# Patient Record
Sex: FEMALE | Race: OTHER | Employment: OTHER | URBAN - METROPOLITAN AREA
[De-identification: names, ages, dates, MRNs, and addresses within clinical notes are randomized per-mention and may not be internally consistent; named-entity substitution may affect disease eponyms.]

---

## 2017-04-03 NOTE — PATIENT DISCUSSION
(H02.135) Senile ectropion of left lower eyelid - Assesment : Examination revealed Senile Ectropion (slight). Patient had surgery about a month ago with . Continue massaging area and using ointment per 's post op care. Advised patient that lid is pulling down/away secondary to surgery and eye is drying out recommend lubrication treatment this time. - Plan : Monitor for change in vision. Continue follow up care with Dr. Shane Ricks OTC artificial tears 3-4 times a day in both eyes. Rtc 3-4 months follow up and Exam OCTM.

## 2017-08-21 NOTE — PATIENT DISCUSSION
(W40.1307) Nonexudative age-related macular degeneration left eye inter - Assesment : Examination revealed AMD Dry.-INTERMEDIATE with slight elevation. Advised patient changes on examination and testing - left eye. Recommend retina consult to further evaluate and rule out possible Wet ARMD - left eye - Plan : Retina Consult  Patient advised to check [employer] regularly (once weekly or more) and use nutraceuticals such as AREDS 2 eye vitamins. Wear sunglasses when outdoors and eat green, leafy vegetables to maintain ocular health.

## 2017-08-21 NOTE — PATIENT DISCUSSION
(Q23.7474) Nonexudative age-related macular degeneration right eye riya - Assesment : Examination revealed AMD Dry.-MILD - Plan : Retina Consult. Advised patient to call our office with decreased vision or increased distortion. Patient advised to check [employer] regularly (once weekly or more) and use nutraceuticals such as AREDS 2 eye vitamins. Wear sunglasses when outdoors and eat green, leafy vegetables to maintain ocular health.

## 2018-06-01 NOTE — PATIENT DISCUSSION
(C56.8240) Nonexudative age-related macular degeneration left eye inter - Assesment : Examination revealed AMD Dry.-INTERMEDIATE - Plan : See plan # 1

## 2018-06-01 NOTE — PATIENT DISCUSSION
(H02.135) Senile ectropion of left lower eyelid - Assesment : Examination revealed Senile Ectropion (slight). - Plan : Monitor for change in vision. Start GenTeal Gel apply to lower lid QHS. Advised to call office if symptoms worsen.

## 2018-06-01 NOTE — PATIENT DISCUSSION
(H11.041) Peripheral pterygium, stationary, right eye - Assesment : Examination revealed Pterygium-Progressive. Nasal - Plan : Monitor for changes. Advised patient to call our office with decreased vision or increased symptoms.

## 2018-06-01 NOTE — PATIENT DISCUSSION
(H36.5371) Nonexudative age-related macular degeneration right eye riya - Assesment : Examination revealed AMD Dry.-MILD - Plan : Advised patient to call our office with decreased vision or increased distortion. Patient advised to check [employer] regularly (once weekly or more) and use nutraceuticals such as AREDS 2 eye vitamins. Wear sunglasses when outdoors and eat green, leafy vegetables to maintain ocular health. G.lasse updated and given.   RTC 6 months follow up/OCT mac

## 2018-12-07 NOTE — PATIENT DISCUSSION
(H37.4522) Nonexudative age-related macular degeneration right eye riya - Assesment : Examination revealed AMD Dry.-MILD possible progression - Plan : Advised patient to call our office with decreased vision or increased distortion. Patient advised to check [employer] regularly (once weekly or more) and use nutraceuticals such as AREDS 2 eye vitamins. Wear sunglasses when outdoors and eat green, leafy vegetables to maintain ocular health.    RTC 3 months dilation/OCT mac,or  sooner with changes to vision

## 2019-02-15 ENCOUNTER — NEW PATIENT COMPREHENSIVE (OUTPATIENT)
Dept: URBAN - METROPOLITAN AREA CLINIC 43 | Facility: CLINIC | Age: 54
End: 2019-02-15

## 2019-02-15 DIAGNOSIS — H52.4: ICD-10-CM

## 2019-02-15 PROCEDURE — 92004 COMPRE OPH EXAM NEW PT 1/>: CPT

## 2019-02-15 PROCEDURE — 92015 DETERMINE REFRACTIVE STATE: CPT

## 2019-02-15 ASSESSMENT — VISUAL ACUITY
OS_SC: 20/25-1
OS_CC: J1
OS_SC: J6
OD_SC: J4
OD_CC: J1
OD_SC: 20/20-2

## 2019-02-15 ASSESSMENT — TONOMETRY
OS_IOP_MMHG: 16
OD_IOP_MMHG: 15

## 2020-06-04 ENCOUNTER — ESTABLISHED COMPREHENSIVE EXAM (OUTPATIENT)
Dept: URBAN - METROPOLITAN AREA CLINIC 43 | Facility: CLINIC | Age: 55
End: 2020-06-04

## 2020-06-04 DIAGNOSIS — H52.4: ICD-10-CM

## 2020-06-04 PROCEDURE — 92014 COMPRE OPH EXAM EST PT 1/>: CPT

## 2020-06-04 PROCEDURE — 92015 DETERMINE REFRACTIVE STATE: CPT

## 2020-06-04 ASSESSMENT — VISUAL ACUITY
OS_SC: 20/20
OS_SC: J8
OS_CC: J1
OD_SC: 20/20-1
OD_SC: J10
OD_CC: J1

## 2020-06-04 ASSESSMENT — TONOMETRY
OS_IOP_MMHG: 12
OD_IOP_MMHG: 12

## 2022-05-02 ENCOUNTER — COMPREHENSIVE EXAM (OUTPATIENT)
Dept: URBAN - METROPOLITAN AREA CLINIC 43 | Facility: CLINIC | Age: 57
End: 2022-05-02

## 2022-05-02 DIAGNOSIS — H52.4: ICD-10-CM

## 2022-05-02 PROCEDURE — 92014 COMPRE OPH EXAM EST PT 1/>: CPT

## 2022-05-02 PROCEDURE — 92015 DETERMINE REFRACTIVE STATE: CPT

## 2022-05-02 ASSESSMENT — VISUAL ACUITY
OD_SC: 20/20-1
OS_SC: 20/20
OD_SC: J10
OD_CC: J1
OS_SC: J6
OS_CC: J1

## 2022-05-02 ASSESSMENT — TONOMETRY
OS_IOP_MMHG: 12
OD_IOP_MMHG: 12

## 2022-08-23 ENCOUNTER — EMERGENCY VISIT (OUTPATIENT)
Dept: URBAN - METROPOLITAN AREA CLINIC 43 | Facility: CLINIC | Age: 57
End: 2022-08-23

## 2022-08-23 DIAGNOSIS — H04.123: ICD-10-CM

## 2022-08-23 PROCEDURE — 92012 INTRM OPH EXAM EST PATIENT: CPT

## 2022-08-23 RX ORDER — NEOMYCIN SULFATE, POLYMYXIN B SULFATE AND DEXAMETHASONE 3.5; 10000; 1 MG/G; [USP'U]/G; MG/G: OINTMENT OPHTHALMIC EVERY EVENING

## 2022-08-23 ASSESSMENT — VISUAL ACUITY
OD_SC: 20/30
OS_SC: 20/25

## 2022-08-23 ASSESSMENT — TONOMETRY
OS_IOP_MMHG: 15
OD_IOP_MMHG: 15

## 2022-08-26 ENCOUNTER — EMERGENCY VISIT (OUTPATIENT)
Dept: URBAN - METROPOLITAN AREA CLINIC 47 | Facility: CLINIC | Age: 57
End: 2022-08-26

## 2022-08-26 DIAGNOSIS — M35.00: ICD-10-CM

## 2022-08-26 DIAGNOSIS — H16.143: ICD-10-CM

## 2022-08-26 PROCEDURE — 99199RSP RESIDENT SUPERVISED BY PROVIDER

## 2022-08-26 PROCEDURE — 68761C PUNCTUM PLUG /COLLAGEN, EACH

## 2022-08-26 PROCEDURE — 99213 OFFICE O/P EST LOW 20 MIN: CPT

## 2022-08-26 PROCEDURE — A4262 TEMPORARY TEAR DUCT PLUG: HCPCS

## 2022-08-26 RX ORDER — VARENICLINE 0.03 MG/.05ML: 1 SPRAY NASAL TWICE A DAY

## 2022-08-26 RX ORDER — LOTEPREDNOL ETABONATE AND TOBRAMYCIN 5; 3 MG/ML; MG/ML: 1 SUSPENSION/ DROPS OPHTHALMIC

## 2022-08-26 ASSESSMENT — VISUAL ACUITY
OS_SC: 20/20
OU_SC: J3
OD_SC: 20/20

## 2022-08-26 ASSESSMENT — TONOMETRY
OS_IOP_MMHG: 13
OD_IOP_MMHG: 12

## 2022-09-06 ENCOUNTER — FOLLOW UP (OUTPATIENT)
Dept: URBAN - METROPOLITAN AREA CLINIC 47 | Facility: CLINIC | Age: 57
End: 2022-09-06

## 2022-09-06 DIAGNOSIS — M35.00: ICD-10-CM

## 2022-09-06 DIAGNOSIS — H16.143: ICD-10-CM

## 2022-09-06 PROCEDURE — 99213 OFFICE O/P EST LOW 20 MIN: CPT

## 2022-09-06 RX ORDER — LOTEPREDNOL ETABONATE 3.8 MG/G: 1 GEL OPHTHALMIC TWICE A DAY

## 2022-09-06 ASSESSMENT — VISUAL ACUITY
OS_SC: 20/20-1
OD_SC: 20/20-1

## 2022-09-06 ASSESSMENT — TONOMETRY
OD_IOP_MMHG: 14
OS_IOP_MMHG: 14

## 2023-10-25 ENCOUNTER — COMPREHENSIVE EXAM (OUTPATIENT)
Dept: URBAN - METROPOLITAN AREA CLINIC 43 | Facility: CLINIC | Age: 58
End: 2023-10-25

## 2023-10-25 DIAGNOSIS — H52.4: ICD-10-CM

## 2023-10-25 PROCEDURE — 92014 COMPRE OPH EXAM EST PT 1/>: CPT

## 2023-10-25 PROCEDURE — 92015 DETERMINE REFRACTIVE STATE: CPT

## 2023-10-25 ASSESSMENT — VISUAL ACUITY
OD_SC: J8
OS_SC: J8
OD_SC: 20/20
OS_CC: J2
OS_SC: 20/25+2
OD_CC: J1

## 2023-10-25 ASSESSMENT — TONOMETRY
OS_IOP_MMHG: 17
OD_IOP_MMHG: 16

## 2025-03-28 ENCOUNTER — EMERGENCY VISIT (OUTPATIENT)
Age: 60
End: 2025-03-28

## 2025-03-28 DIAGNOSIS — H16.143: ICD-10-CM

## 2025-03-28 DIAGNOSIS — M35.00: ICD-10-CM

## 2025-03-28 PROCEDURE — 92012 INTRM OPH EXAM EST PATIENT: CPT

## 2025-03-28 RX ORDER — NEOMYCIN SULFATE, POLYMYXIN B SULFATE AND DEXAMETHASONE 3.5; 10000; 1 MG/G; [USP'U]/G; MG/G: OINTMENT OPHTHALMIC EVERY EVENING

## 2025-04-14 ENCOUNTER — FOLLOW UP (OUTPATIENT)
Age: 60
End: 2025-04-14

## 2025-04-14 DIAGNOSIS — M35.00: ICD-10-CM

## 2025-04-14 DIAGNOSIS — H16.143: ICD-10-CM

## 2025-04-14 PROCEDURE — 92012 INTRM OPH EXAM EST PATIENT: CPT

## 2025-04-18 ENCOUNTER — CONSULTATION/EVALUATION (OUTPATIENT)
Age: 60
End: 2025-04-18

## 2025-04-18 DIAGNOSIS — H43.813: ICD-10-CM

## 2025-04-18 DIAGNOSIS — M35.01: ICD-10-CM

## 2025-04-18 PROCEDURE — 68761L LACRIFILL: Mod: E1,E2,E3,E4

## 2025-04-18 PROCEDURE — 92025 CPTRIZED CORNEAL TOPOGRAPHY: CPT | Mod: NC

## 2025-04-18 PROCEDURE — A4262 TEMPORARY TEAR DUCT PLUG: HCPCS | Mod: E1,E2,E3,E4

## 2025-04-18 PROCEDURE — 99214 OFFICE O/P EST MOD 30 MIN: CPT | Mod: 25

## 2025-04-18 PROCEDURE — 92134 CPTRZ OPH DX IMG PST SGM RTA: CPT

## 2025-04-29 ENCOUNTER — CLINIC PROCEDURE ONLY (OUTPATIENT)
Age: 60
End: 2025-04-29

## 2025-04-29 DIAGNOSIS — M35.01: ICD-10-CM

## 2025-04-29 PROCEDURE — 99199ST SERUM TEARS

## 2025-07-18 ENCOUNTER — PREPPED CHART (OUTPATIENT)
Age: 60
End: 2025-07-18